# Patient Record
Sex: MALE | Race: WHITE | Employment: FULL TIME | URBAN - METROPOLITAN AREA
[De-identification: names, ages, dates, MRNs, and addresses within clinical notes are randomized per-mention and may not be internally consistent; named-entity substitution may affect disease eponyms.]

---

## 2020-08-04 ENCOUNTER — TRANSFERRED RECORDS (OUTPATIENT)
Dept: HEALTH INFORMATION MANAGEMENT | Facility: CLINIC | Age: 60
End: 2020-08-04

## 2020-09-10 ENCOUNTER — TRANSFERRED RECORDS (OUTPATIENT)
Dept: HEALTH INFORMATION MANAGEMENT | Facility: CLINIC | Age: 60
End: 2020-09-10

## 2020-09-23 ENCOUNTER — TRANSFERRED RECORDS (OUTPATIENT)
Dept: HEALTH INFORMATION MANAGEMENT | Facility: CLINIC | Age: 60
End: 2020-09-23

## 2020-11-09 ENCOUNTER — TRANSFERRED RECORDS (OUTPATIENT)
Dept: HEALTH INFORMATION MANAGEMENT | Facility: CLINIC | Age: 60
End: 2020-11-09

## 2020-11-20 ENCOUNTER — TRANSFERRED RECORDS (OUTPATIENT)
Dept: HEALTH INFORMATION MANAGEMENT | Facility: CLINIC | Age: 60
End: 2020-11-20

## 2021-01-25 ENCOUNTER — TRANSFERRED RECORDS (OUTPATIENT)
Dept: HEALTH INFORMATION MANAGEMENT | Facility: CLINIC | Age: 61
End: 2021-01-25

## 2021-05-19 ENCOUNTER — TRANSFERRED RECORDS (OUTPATIENT)
Dept: HEALTH INFORMATION MANAGEMENT | Facility: CLINIC | Age: 61
End: 2021-05-19

## 2021-09-22 ENCOUNTER — OFFICE VISIT (OUTPATIENT)
Dept: ORTHOPEDICS | Facility: CLINIC | Age: 61
End: 2021-09-22
Payer: COMMERCIAL

## 2021-09-22 DIAGNOSIS — M17.12 PRIMARY OSTEOARTHRITIS OF LEFT KNEE: Primary | ICD-10-CM

## 2021-09-22 PROCEDURE — 20610 DRAIN/INJ JOINT/BURSA W/O US: CPT | Mod: LT | Performed by: FAMILY MEDICINE

## 2021-09-22 PROCEDURE — 99207 PR DROP WITH A PROCEDURE: CPT | Performed by: FAMILY MEDICINE

## 2021-09-22 RX ORDER — DICLOFENAC SODIUM 100 MG/1
100 TABLET, FILM COATED, EXTENDED RELEASE ORAL
COMMUNITY
Start: 2020-11-09

## 2021-09-22 RX ORDER — FOLIC ACID 1 MG/1
1 TABLET ORAL
COMMUNITY
Start: 2020-11-10

## 2021-09-22 RX ORDER — ROSUVASTATIN CALCIUM 40 MG/1
40 TABLET, COATED ORAL
COMMUNITY
Start: 2021-02-15

## 2021-09-22 RX ORDER — TRIAMCINOLONE ACETONIDE 40 MG/ML
40 INJECTION, SUSPENSION INTRA-ARTICULAR; INTRAMUSCULAR
Status: SHIPPED | OUTPATIENT
Start: 2021-09-22

## 2021-09-22 RX ORDER — SOD CHLORIDE/YERBA SANTA/GLYCE
1 SOLUTION, NON-ORAL NASAL
COMMUNITY
Start: 2021-03-18

## 2021-09-22 RX ORDER — METOPROLOL TARTRATE 25 MG/1
25 TABLET, FILM COATED ORAL
COMMUNITY
Start: 2021-02-15

## 2021-09-22 RX ORDER — ASPIRIN 325 MG
325 TABLET, DELAYED RELEASE (ENTERIC COATED) ORAL
COMMUNITY
Start: 2020-11-10

## 2021-09-22 RX ADMIN — TRIAMCINOLONE ACETONIDE 40 MG: 40 INJECTION, SUSPENSION INTRA-ARTICULAR; INTRAMUSCULAR at 09:18

## 2021-09-22 NOTE — PROGRESS NOTES
[facility]  SPORTS MEDICINE CLINIC VISIT     Sep 22, 2021        ASSESSMENT & PLAN    61-year-old with osteoarthritis of the left knee.    Reviewed imaging and assessment.  At this time patient would like to delay TKA as long as possible and is interested in repeat injections.  Steroid injection given today.  Okay to repeat every 3 months as needed if pain recurs.      Martinez Perdue MD  [facility] SPORTS MEDICINE [facility]    -----  Chief Complaint   Patient presents with     Consult     left knee pain, last injection 5/19/21        SUBJECTIVE  Damir Walsh is a/an 61 year old male who is seen as a self referral for evaluation of  Left knee pain.     The patient is seen by themselves.    Onset: longstanding. Reports insidious onset without acute precipitating event.  Location of Pain: left knee pain  Worsened by: walking, movement  Better with: rest  Treatments tried: rest/activity avoidance and corticosteroid injection (most recent date: 5/19/21) that provided   Associated symptoms: no distal numbness or tingling; denies swelling or warmth    Orthopedic/Surgical history: Right TKA   Social History/Occupation: Delivery bread       REVIEW OF SYSTEMS:    Do you have fever, chills, weight loss? No    Do you have any vision problems? No    Do you have any chest pain or edema? No    Do you have any shortness of breath or wheezing?  No    Do you have stomach problems? No    Do you have any numbness or focal weakness? No    Do you have diabetes? No    Do you have problems with bleeding or clotting? No    Do you have an rashes or other skin lesions? No    OBJECTIVE:      Patient is alert, No acute distress, pleasant and conversational.    Gait: nonantalgic. Normal heel toe gait.    left knee:   Skin intact. No erythema or ecchymosis.  No effusion or soft tissue swelling.    AROM: Zero to approximately 135  without restriction or reported pain.    Palpation: No medial or lateral facet joint  tenderness.  TTP along the medial joint line.  No TTP along the lateral joint line    Full Isometric quad strength, extensor mechanism in place     Neurovascularly intact in the lower extremity    Hip and Ankle with full AROM and nontender      RADIOLOGY:    Reviewed previous x-rays of the left knee dated 11/20/2020 and 8/4/2020 demonstrating severe medial compartment DJD.}     Large Joint Injection/Arthocentesis: L knee joint    Date/Time: 9/22/2021 9:18 AM  Performed by: Martinez Perdue MD  Authorized by: Martinez Perdue MD     Indications:  Pain  Needle Size:  22 G  Guidance: landmark guided    Approach:  Lateral  Location:  Knee      Medications:  40 mg triamcinolone 40 MG/ML  Outcome:  Tolerated well, no immediate complications  Procedure discussed: discussed risks, benefits, and alternatives    Consent Given by:  Patient  Timeout: timeout called immediately prior to procedure    Prep: patient was prepped and draped in usual sterile fashion       There were no complications. The patient tolerated the procedure well. There was minimal bleeding.   The patient was instructed to ice the knee upon leaving clinic and refrain from overuse over the next 2 days.   The patient was instructed to go to the emergency room with any unusual pain, swelling, or redness occurred in the injected area.     Martinez Perdue MD      Scribed by Bárbara Cintron ATC for Dr. Perdue on 9/22/21 at 9:30 AM, based on the providers statements to me.

## 2021-09-22 NOTE — LETTER
9/22/2021         RE: Damir Walsh  [id number] [address] [place] [id number]        Dear Colleague,    Thank you for referring your patient, Damir Walsh, to the [facility] SPORTS MEDICINE CLINIC [place]. Please see a copy of my visit note below.      [facility]  SPORTS MEDICINE CLINIC VISIT     Sep 22, 2021        ASSESSMENT & PLAN    61-year-old with osteoarthritis of the left knee.    Reviewed imaging and assessment.  At this time patient would like to delay TKA as long as possible and is interested in repeat injections.  Steroid injection given today.  Okay to repeat every 3 months as needed if pain recurs.      Martinez Perdue MD  [facility] SPORTS MEDICINE [facility]    -----  Chief Complaint   Patient presents with     Consult     left knee pain, last injection 5/19/21        SUBJECTIVE  Damir Walsh is a/an 61 year old male who is seen as a self referral for evaluation of  Left knee pain.     The patient is seen by themselves.    Onset: longstanding. Reports insidious onset without acute precipitating event.  Location of Pain: left knee pain  Worsened by: walking, movement  Better with: rest  Treatments tried: rest/activity avoidance and corticosteroid injection (most recent date: 5/19/21) that provided   Associated symptoms: no distal numbness or tingling; denies swelling or warmth    Orthopedic/Surgical history: Right TKA   Social History/Occupation: Delivery bread       REVIEW OF SYSTEMS:    Do you have fever, chills, weight loss? No    Do you have any vision problems? No    Do you have any chest pain or edema? No    Do you have any shortness of breath or wheezing?  No    Do you have stomach problems? No    Do you have any numbness or focal weakness? No    Do you have diabetes? No    Do you have problems with bleeding or clotting? No    Do you have an rashes or other skin lesions? No    OBJECTIVE:      Patient is alert, No acute distress, pleasant and  conversational.    Gait: nonantalgic. Normal heel toe gait.    left knee:   Skin intact. No erythema or ecchymosis.  No effusion or soft tissue swelling.    AROM: Zero to approximately 135  without restriction or reported pain.    Palpation: No medial or lateral facet joint tenderness.  TTP along the medial joint line.  No TTP along the lateral joint line    Full Isometric quad strength, extensor mechanism in place     Neurovascularly intact in the lower extremity    Hip and Ankle with full AROM and nontender      RADIOLOGY:    Reviewed previous x-rays of the left knee dated 11/20/2020 and 8/4/2020 demonstrating severe medial compartment DJD.}     Large Joint Injection/Arthocentesis: L knee joint    Date/Time: 9/22/2021 9:18 AM  Performed by: Martinez Perdue MD  Authorized by: Martinez Perdue MD     Indications:  Pain  Needle Size:  22 G  Guidance: landmark guided    Approach:  Lateral  Location:  Knee      Medications:  40 mg triamcinolone 40 MG/ML  Outcome:  Tolerated well, no immediate complications  Procedure discussed: discussed risks, benefits, and alternatives    Consent Given by:  Patient  Timeout: timeout called immediately prior to procedure    Prep: patient was prepped and draped in usual sterile fashion       There were no complications. The patient tolerated the procedure well. There was minimal bleeding.   The patient was instructed to ice the knee upon leaving clinic and refrain from overuse over the next 2 days.   The patient was instructed to go to the emergency room with any unusual pain, swelling, or redness occurred in the injected area.     Martinez Perdue MD      Scribed by Bárbara Cintron ATC for Dr. Perdue on 9/22/21 at 9:30 AM, based on the providers statements to me.           Again, thank you for allowing me to participate in the care of your patient.        Sincerely,        Martinez Perdue MD